# Patient Record
(demographics unavailable — no encounter records)

---

## 2024-10-30 NOTE — REASON FOR VISIT
[Follow - Up] : a follow-up visit [Thyroid nodule/ MNG] : thyroid nodule/ MNG [Transgender Care] : transgender care [Home] : at home, [unfilled] , at the time of the visit. [Medical Office: ([facility])___] : at the medical office located in  [Patient] : the patient

## 2024-10-30 NOTE — REVIEW OF SYSTEMS
[Fatigue] : fatigue [Recent Weight Gain (___ Lbs)] : recent weight gain: [unfilled] lbs [All other systems negative] : All other systems negative [Recent Weight Loss (___ Lbs)] : no recent weight loss [Visual Field Defect] : no visual field defect [Dysphagia] : no dysphagia [Dysphonia] : no dysphonia [Chest Pain] : no chest pain [Palpitations] : no palpitations [Shortness Of Breath] : no shortness of breath [Nausea] : no nausea [Vomiting] : no vomiting [Polyuria] : no polyuria [Joint Pain] : no joint pain [Headaches] : no headaches [Tremors] : no tremors [Depression] : no depression [Polydipsia] : no polydipsia [Cold Intolerance] : no cold intolerance [Heat Intolerance] : no heat intolerance [FreeTextEntry4] : +neck pressure when nodule develops fluid

## 2024-10-30 NOTE — ASSESSMENT
[FreeTextEntry1] : 27 y/o trans male on masculinizing gender affirming hormone therapy since 2016. Discussed hormone affirming treatment for masculinization with testosterone. Reviewed various formulations of testosterone. Pt. was interested in switching to Testopel. s/p last implantation with 10 pellets on the right 3/2024. Check levels today. Risks and benefits of testosterone described including polycythemia and liver effects. Reviewed goal testosterone level of 300-400 as trough and 700-800 as peak level. Mid-range ideally should be 500-600 for cis-gender mid male range. GYN follow-up recommended especially if interested in LOUANN. No plans for reproductive assistance. Will needs age-appropriate cancer screening in the future such as mammogram, colonoscopy, PAP until he has bottom surgery.  Continue to monitor thyroid nodules with Thyroid US. Thyroid US done 8/2023 at bedside. Thyroid US in office showed mostly cystic nodule with a small amount of isoechoic solid component measuring 2.1x1.9x1.1cm. Thyroid cyst aspiration done at bedside with patient's consent due to patient's report of discomfort from the nodule. After the thyroid cystic drainage, the nodule appeared stable in size but more solid isoechoic. No calcifications. Samples sent for cytology with evidence of hemorrhagic cyst. Given increasing size and recurrence of fluid would recommend hemithyroidectomy if continues to be bothersome.   HLD- check lipids

## 2024-10-30 NOTE — PHYSICAL EXAM
[Alert] : alert [Well Nourished] : well nourished [Healthy Appearance] : healthy appearance [No Acute Distress] : no acute distress [Well Developed] : well developed [No Proptosis] : no proptosis [No Respiratory Distress] : no respiratory distress [No Accessory Muscle Use] : no accessory muscle use [Normal Rate and Effort] : normal respiratory rate and effort [No Stigmata of Cushings Syndrome] : no stigmata of Cushings Syndrome [No Involuntary Movements] : no involuntary movements were seen [Oriented x3] : oriented to person, place, and time [Normal Affect] : the affect was normal [Normal Mood] : the mood was normal [Normal Hearing] : hearing was normal [Acanthosis Nigricans] : no acanthosis nigricans [de-identified] : +healed left pellet site

## 2024-10-30 NOTE — HISTORY OF PRESENT ILLNESS
[FreeTextEntry1] : Preferred Name: Israel  Preferred Pronouns: He/Him Name/Gender Marker Change: Completed.   On masculinizing gender affirming hormone therapy since 2016. Had been on testosterone cypionate 200mg/ml was injecting 0.7ml every 2 weeks, provided by Dr. Moshe Trevizo at [facility]. Changed to 0.35ml every week IM 4/2022. Seen initially by me 7/2022, thought dose was too low due to cramping around 4 days after injection. Recommended taking 0.5ml weekly, but self-lowered to 0.45ml due to cramping at the beginning of the week. Had some spotting and bleeding with some nonadherence due to lack of medication refills. Recommended Testopel s/p implant 12/2022 with 10 pellets on the left and then had a second implant with 10 pellets on the right in 5/2023. Last Testopel 8/2024. Tolerated well. Has noticed more stable energy. Happy with pellets vs. injections. Noticed some cramping recently. Also reports some fatigue after eating. Since starting testosterone has developed increased facial hair, deeper voice, body fat redistribution. Looking to maintain changes. Overall, patient happy that since testopel he has had more facial hair growth and is shaving daily Plans on donating blood this week.  Interested in LOUANN. Not interested in egg freezing.  +hx of menorrhagia without oligomenorrhea tried on OCP without improvement at age 18. 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 Mackay which helped with dysphoria, but he says it can also cause dysmorphia. Prior to top surgery was binding with underworks full torso with worsening of asthma and breathing.  Dresses masculine. Doesn't pack. No headaches, changes in vision, chest pain, SOB, palpitations, LE swelling, calf pain.  +fatigue improved s/p pellets. Has noticed some worsening depression and anxiety now seeing Cassandra. No personal hx of thromboembolic disease. Father with hx of DVT.  Maternal Grandmother and Aunt with Breast cancer. No personal hx of liver disorders.  Good support with friends and family.  Has been in therapy and psychiatry on and off since 2010. Has been diagnosed with PTSD, depression, anxiety, and ADHD in the past.   Hx of Thyroid nodules. Last Thyroid US 8/2023: Heterogenous gland. Isthmus nodule 1.1 x 0.8 x 0.5; Right 2.2 x 2.2 x 1.4; additional right subcentimeter nodules; 0.5 x 0.7 x 0.3cm left nodule.   FNA Right Thyroid Nodule 7/2015: 2.1 cm x 1.0 x 1.09 benign consistent with nodular goiter with cystic changes.  Had noticed some neck pressure when nodule develops more fluid. Aspiration performed 8/2023 with path consistent with hemorrhagic cyst and improvement in symptoms. No recent dysphonia or dysphagia.  Following with Dr. Guan. Will be considering hemithyroidectomy.  Chemically euthyroid as of 6/2024.  Patient reports a family history of thyroid cancer. Denies dysphagia to solid/liquids and reports improvement in discomfort with swallowing. Denies dysphonia or dyspnea/choking. Reports diffixulty losing weight.

## 2024-10-30 NOTE — DATA REVIEWED
[FreeTextEntry1] : Labs 6/2024: Testosterone 399 Hb 16.6 A1c 5.1%  Chol 225 HDL 49  CMP normal  TSH 0.53 Free T4 1.3  Labs 11/2023: Hb 15.7 A1c 4.9% TG 85 Chol 199 HDL 50  CMP normal Testosterone 152  FNA 8/2023: Consistent with hemorrhagic cyst.   Labs 8/2023: Testosterone 363 Hb 15.9 CMP normal  Focused Thyroid US 8/2/2023: mostly cystic nodule with a small amount of isoechoic solid component measuring 2.1x1.9x1.1cm. Thyroid cyst aspiration done at bedside with patient's consent due to patient's report of discomfort from the nodule. After the thyroid cystic drainage, the nodule appeared stable in size but more solid isoechoic. No calcifications.   Labs 3/2023: Testosterone 468 TSH 0.72 Free T4 1.3  Chol 179 HDL 46  CMP normal Labs 2/8/2023: Testosterone 467 Hb 17.1  Labs 9/2022: Hb 15.5 A1c 5.1% TSH 0.57 Free T4 1.5 Testosterone 448 TG 73 Chol 162 HDL 43  CMP normal  Labs 3/2022: Ca 10.3 PTH 30 Free T4 1.4 TSH 0.43 TPO Ab +   Thyroid US 3/2022: Heterogenous gland. Isthmus nodule 1.1 x 0.8 x 0.5; Right 2.2 x 2.2 x 1.4; additional right subcentimeter nodules; 0.5 x 0.7 x 0.3cm left nodule.   FNA Right Thyroid Nodule 7/2015: 2.1 cm x 1.0 x 1.09 benign consistent with nodular goiter with cystic changes.

## 2024-12-30 NOTE — HISTORY OF PRESENT ILLNESS
[de-identified] : Mr. Allen is a 28-year-old transgender man, with history significant for Hashimoto's thyroiditis and cervical disc disease, who presents for initial evaluation of a right thyroid nodule.  He states that he was diagnosed with a thyroid nodule at age 12 and has since had almost yearly follow-up ultrasounds and biopsies and/or cyst aspirations.  His most recent ultrasound-guided FNA biopsy, performed 11/05/2024 ([facility]), reported a right mid 1.4 cm heterogeneous thyroid nodule which was cytologically felt to be consistent with a benign nodule (Opelika II).  When compared to his prior imaging, the nodule measured smaller.  FNA biopsies of the nodule performed on 08/14/2015 ([facility]) and 03/20/2020 ([facility]) were also felt to be consistent with benign nodules (Opelika II).  He states that his father and paternal uncle had thyroid cancer and that his mother has hypothyroidism.  He reports intermittent sharp right neck pain and occasional "pressure" with swallowing.  Labs performed 06/18/2024 revealed TSH = 0.53.  Labs performed 03/29/2022 revealed an elevated TPO Ab = 35.6 and serum TG = < 20.

## 2024-12-30 NOTE — ASSESSMENT
[FreeTextEntry1] : Assessment:  28-year-old transgender man, with history significant for Hashimoto's thyroiditis and cervical disc disease, presents with a likely symptomatic presumably benign right thyroid nodule.   Plan: - Management options of his likely symptomatic presumably benign right thyroid nodule were discussed including continued observation, versus undergoing an elective right thyroid lobectomy, versus RFA.   - The risks of thyroid surgery, including but not limited to, bleeding/hematoma formation, infection, damage to surrounding structures (namely the recurrent laryngeal nerve with resultant transient or permanent hoarseness of voice, the external branch of the superior laryngeal nerve with resultant change in pitch/projection of voice, and the parathyroid glands with resultant transient or permanent hypocalcemia), wound healing issues including internal/external scarring, regional numbness, and seroma formation, the need for thyroid hormone replacement, and the possibility of persistent and/or worsening of symptoms were then discussed and all questions were answered. - The patient expressed understanding of the above and stated that he wanted to look into RFA.  He was therefore provided with the name and contact information for Dr. Cardozo who performs RFA at our institution.  He will continue to follow-up with his endocrinologist and was encouraged to follow-up with me if he would like to schedule surgery.

## 2024-12-30 NOTE — PHYSICAL EXAM
[Midline] : located in midline position [Normal] : orientation to person, place, and time: normal [de-identified] : The neck appears flat.  There are no palpable masses.  A limited in-office ultrasound revealed a mostly homogeneous thyroid a dominant right mid anterior predominantly solid nodule with shadowing calcification and a smaller circumscribed right mid medial heterogeneous nodule.  [de-identified] : Extremities: CAMILO x 4.   Skin: No obvious skin lesions.   Voice: clear

## 2025-01-31 NOTE — PROCEDURE
[FreeTextEntry1] : The patient was prepped and draped in a sterile manner, 9 mL of 1% lidocaine (with epinephrine) was injected using a needle along the right lateral and upper buttocks. 10 Testopel pellets were implanted without difficulty  Exp: 01/2027 Lot:  S/N: [id number] GTIN: [id number]  15 minutes after the procedure the patient was examined. Blood pressure and pulse were obtained and were at his baseline. There was minimal blood stain on the dressing. Discharge instructions were reviewed. He was discharged home in excellent condition. An appointment was made for followup in 6 weeks. He will call with any questions or concerns.

## 2025-03-19 NOTE — HISTORY OF PRESENT ILLNESS
[FreeTextEntry1] : Preferred Name: Israel  Preferred Pronouns: He/Him Name/Gender Marker Change: Completed.   On masculinizing gender affirming hormone therapy since 2016. Had been on testosterone cypionate 200mg/ml was injecting 0.7ml every 2 weeks, provided by Dr. Moshe Trevizo at [facility]. Changed to 0.35ml every week IM 4/2022. Seen initially by me 7/2022, thought dose was too low due to cramping around 4 days after injection. Recommended taking 0.5ml weekly, but self-lowered to 0.45ml due to cramping at the beginning of the week. Had some spotting and bleeding with some nonadherence due to lack of medication refills. Recommended Testopel s/p implant 12/2022 with 10 pellets on the left and then had a second implant with 10 pellets on the right in 5/2023. Last Testopel 1/2025. Tolerated well. Has noticed more stable energy. Happy with pellets vs. injections. No recent cramping or spotting. Since starting testosterone has developed increased facial hair, deeper voice, body fat redistribution. Looking to maintain changes. Overall, patient happy that since testopel he has had more facial hair growth and is shaving daily Donates blood every 4 months.  Interested in LOUANN. Not interested in egg freezing.  +hx of menorrhagia without oligomenorrhea tried on OCP without improvement at age 18. 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 Mackay which helped with dysphoria, but he says it can also cause dysmorphia. Prior to top surgery was binding with underworks full torso with worsening of asthma and breathing.  Dresses masculine. Doesn't pack. No headaches, changes in vision, chest pain, SOB, palpitations, LE swelling, calf pain.  +fatigue improved s/p pellets. Has noticed some worsening depression and anxiety now seeing Cassandra. No personal hx of thromboembolic disease. Father with hx of DVT.  Maternal Grandmother and Aunt with Breast cancer. No personal hx of liver disorders.  Good support with friends and family.  Has been in therapy and psychiatry on and off since 2010. Has been diagnosed with PTSD, depression, anxiety, and ADHD in the past.   Hx of Thyroid nodules. Last Thyroid US 8/2023: Heterogenous gland. Isthmus nodule 1.1 x 0.8 x 0.5; Right 2.2 x 2.2 x 1.4; additional right subcentimeter nodules; 0.5 x 0.7 x 0.3cm left nodule.   FNA Right Thyroid Nodule 7/2015: 2.1 cm x 1.0 x 1.09 benign consistent with nodular goiter with cystic changes.  Had noticed some neck pressure when nodule develops more fluid. Aspiration performed 8/2023 with path consistent with hemorrhagic cyst and improvement in symptoms. No recent dysphonia or dysphagia.  Following with Dr. Guan. Will be considering hemithyroidectomy vs. RFA.  Chemically euthyroid as of 6/2024.  Patient reports a family history of thyroid cancer. Denies dysphagia to solid/liquids and reports improvement in discomfort with swallowing. Denies dysphonia or dyspnea/choking. Reports difficulty losing weight. Has lost some weight with dietary modifications. Concerned about visceral fat. Interested in incretin therapy.

## 2025-03-19 NOTE — REASON FOR VISIT
[Home] : at home, [unfilled] , at the time of the visit. [Medical Office: ([facility])___] : at the medical office located in  [Telehealth (audio & video)] : This visit was provided via telehealth using real-time 2-way audio visual technology. [Verbal consent obtained from patient] : the patient, [unfilled] [Follow - Up] : a follow-up visit

## 2025-03-19 NOTE — ASSESSMENT
[FreeTextEntry1] : 27 y/o trans male on masculinizing gender affirming hormone therapy since 2016. Discussed hormone affirming treatment for masculinization with testosterone. Reviewed various formulations of testosterone. Pt. was interested in switching to Testopel. s/p last implantation 1/2025 with recent levels 3/2025 at goal. Risks and benefits of testosterone described including polycythemia and liver effects. Reviewed goal testosterone level of 300-400 as trough and 700-800 as peak level. Mid-range ideally should be 500-600 for cis-gender mid male range. GYN follow-up recommended especially if interested in LOUANN. No plans for reproductive assistance. Will needs age-appropriate cancer screening in the future such as mammogram, colonoscopy, PAP until he has bottom surgery.  Continue to monitor thyroid nodules with Thyroid US. Thyroid US done 8/2023 at bedside. Thyroid US in office showed mostly cystic nodule with a small amount of isoechoic solid component measuring 2.1x1.9x1.1cm. Thyroid cyst aspiration done at bedside with patient's consent due to patient's report of discomfort from the nodule. After the thyroid cystic drainage, the nodule appeared stable in size but more solid isoechoic. No calcifications. Samples sent for cytology with evidence of hemorrhagic cyst. Given increasing size and recurrence of fluid would recommend hemithyroidectomy if continues to be bothersome. Pt. interested in possible RFA but discussed with him that it may not be as beneficial on a cyst as compared to a solid nodule.   HLD- dietary and lifestyle modifications discussed.  Weight gain- dietary and lifestyle modifications to continue. Consider incretin therapy if no benefit.    Prep time with review of labs and interval progress notes and consultations  Discussion with patient regarding hormone regimen, thyroid, and weight with management plan, risks and benefits, treatment options and goals of care answering all patients questions and addressing all concerns  Post-visit completion charting and review Total Time 30 min  Specifically causes, evaluation, treatment options, risks, complications, and benefits of available therapies were discussed. Questions were answered.  The submitted E/M billing level for this visit reflects the total time spent on the day of the visit including face-to-face time spent with the patient, non-face-to-face review of medical records and relevant information, documentation, and asynchronous communication with the patient after a visit via phone, email, or patients EHR portal after the visit.  The medical records reviewed are either scanned into the chart or reviewed with the patient using a patients electronic medical records portal for patients with records not available to [facility] via electronic transmission platforms from other institutions and labs.  Time spend counseling and performing coordination of care was also included in determining the appropriate EM billing level.  I have reviewed and verified information regarding the chief complaint and history recorded by the ancillary staff and/or the patient. I have independently reviewed and interpreted tests performed by other physicians and facilities as necessary.   I have discussed with the patient differential diagnosis, reason for auxiliary tests if ordered, risks, benefits, alternatives, and complications of each form of therapy were discussed.

## 2025-03-19 NOTE — PHYSICAL EXAM
[Alert] : alert [Well Nourished] : well nourished [Healthy Appearance] : healthy appearance [No Acute Distress] : no acute distress [Well Developed] : well developed [No Proptosis] : no proptosis [Normal Hearing] : hearing was normal [No Respiratory Distress] : no respiratory distress [No Accessory Muscle Use] : no accessory muscle use [Normal Rate and Effort] : normal respiratory rate and effort [No Stigmata of Cushings Syndrome] : no stigmata of Cushings Syndrome [No Involuntary Movements] : no involuntary movements were seen [Oriented x3] : oriented to person, place, and time [Normal Affect] : the affect was normal [Normal Mood] : the mood was normal [Acanthosis Nigricans] : no acanthosis nigricans [de-identified] : +healed left pellet site

## 2025-03-19 NOTE — HISTORY OF PRESENT ILLNESS
[FreeTextEntry1] : Preferred Name: Israel  Preferred Pronouns: He/Him Name/Gender Marker Change: Completed.   On masculinizing gender affirming hormone therapy since 2016. Had been on testosterone cypionate 200mg/ml was injecting 0.7ml every 2 weeks, provided by Dr. Moshe Trevizo at [facility]. Changed to 0.35ml every week IM 4/2022. Seen initially by me 7/2022, thought dose was too low due to cramping around 4 days after injection. Recommended taking 0.5ml weekly, but self-lowered to 0.45ml due to cramping at the beginning of the week. Had some spotting and bleeding with some nonadherence due to lack of medication refills. Recommended Testopel s/p implant 12/2022 with 10 pellets on the left and then had a second implant with 10 pellets on the right in 5/2023. Last Testopel 1/2025. Tolerated well. Has noticed more stable energy. Happy with pellets vs. injections. No recent cramping or spotting. Since starting testosterone has developed increased facial hair, deeper voice, body fat redistribution. Looking to maintain changes. Overall, patient happy that since testopel he has had more facial hair growth and is shaving daily Donates blood every 4 months.  Interested in LOUANN. Not interested in egg freezing.  +hx of menorrhagia without oligomenorrhea tried on OCP without improvement at age 18. 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 Mackay which helped with dysphoria, but he says it can also cause dysmorphia. Prior to top surgery was binding with underworks full torso with worsening of asthma and breathing.  Dresses masculine. Doesn't pack. No headaches, changes in vision, chest pain, SOB, palpitations, LE swelling, calf pain.  +fatigue improved s/p pellets. Has noticed some worsening depression and anxiety now seeing Cassandra. No personal hx of thromboembolic disease. Father with hx of DVT.  Maternal Grandmother and Aunt with Breast cancer. No personal hx of liver disorders.  Good support with friends and family.  Has been in therapy and psychiatry on and off since 2010. Has been diagnosed with PTSD, depression, anxiety, and ADHD in the past.   Hx of Thyroid nodules. Last Thyroid US 8/2023: Heterogenous gland. Isthmus nodule 1.1 x 0.8 x 0.5; Right 2.2 x 2.2 x 1.4; additional right subcentimeter nodules; 0.5 x 0.7 x 0.3cm left nodule.   FNA Right Thyroid Nodule 7/2015: 2.1 cm x 1.0 x 1.09 benign consistent with nodular goiter with cystic changes.  Had noticed some neck pressure when nodule develops more fluid. Aspiration performed 8/2023 with path consistent with hemorrhagic cyst and improvement in symptoms. No recent dysphonia or dysphagia.  Following with Dr. Guan. Will be considering hemithyroidectomy vs. RFA.  Chemically euthyroid as of 6/2024.  Patient reports a family history of thyroid cancer. Denies dysphagia to solid/liquids and reports improvement in discomfort with swallowing. Denies dysphonia or dyspnea/choking. Reports difficulty losing weight. Has lost some weight with dietary modifications. Concerned about visceral fat. Interested in incretin therapy.

## 2025-03-19 NOTE — REASON FOR VISIT
[Home] : at home, [unfilled] , at the time of the visit. [Medical Office: ([facility])___] : at the medical office located in  [Telehealth (audio & video)] : This visit was provided via telehealth using real-time 2-way audio visual technology. [Verbal consent obtained from patient] : the patient, [unfilled] [Follow - Up] : a follow-up visit

## 2025-03-19 NOTE — REVIEW OF SYSTEMS
[Fatigue] : fatigue [All other systems negative] : All other systems negative [Recent Weight Gain (___ Lbs)] : no recent weight gain [Recent Weight Loss (___ Lbs)] : recent weight loss: [unfilled] lbs [Visual Field Defect] : no visual field defect [Dysphagia] : no dysphagia [Dysphonia] : no dysphonia [Chest Pain] : no chest pain [Palpitations] : no palpitations [Shortness Of Breath] : no shortness of breath [Nausea] : no nausea [Vomiting] : no vomiting [Polyuria] : no polyuria [Joint Pain] : no joint pain [Headaches] : no headaches [Tremors] : no tremors [Depression] : no depression [Polydipsia] : no polydipsia [Cold Intolerance] : no cold intolerance [Heat Intolerance] : no heat intolerance [FreeTextEntry4] : +neck pressure when nodule develops fluid

## 2025-03-19 NOTE — PHYSICAL EXAM
[Alert] : alert [Well Nourished] : well nourished [Healthy Appearance] : healthy appearance [No Acute Distress] : no acute distress [Well Developed] : well developed [No Proptosis] : no proptosis [Normal Hearing] : hearing was normal [No Respiratory Distress] : no respiratory distress [No Accessory Muscle Use] : no accessory muscle use [Normal Rate and Effort] : normal respiratory rate and effort [No Stigmata of Cushings Syndrome] : no stigmata of Cushings Syndrome [No Involuntary Movements] : no involuntary movements were seen [Oriented x3] : oriented to person, place, and time [Normal Affect] : the affect was normal [Normal Mood] : the mood was normal [Acanthosis Nigricans] : no acanthosis nigricans [de-identified] : +healed left pellet site

## 2025-03-19 NOTE — ASSESSMENT
[FreeTextEntry1] : 27 y/o trans male on masculinizing gender affirming hormone therapy since 2016. Discussed hormone affirming treatment for masculinization with testosterone. Reviewed various formulations of testosterone. Pt. was interested in switching to Testopel. s/p last implantation 1/2025 with recent levels 3/2025 at goal. Risks and benefits of testosterone described including polycythemia and liver effects. Reviewed goal testosterone level of 300-400 as trough and 700-800 as peak level. Mid-range ideally should be 500-600 for cis-gender mid male range. GYN follow-up recommended especially if interested in [facility]. No plans for reproductive assistance. Will needs age-appropriate cancer screening in the future such as mammogram, colonoscopy, PAP until he has bottom surgery.  Continue to monitor thyroid nodules with Thyroid US. Thyroid US done 8/2023 at bedside. Thyroid US in office showed mostly cystic nodule with a small amount of isoechoic solid component measuring 2.1x1.9x1.1cm. Thyroid cyst aspiration done at bedside with patient's consent due to patient's report of discomfort from the nodule. After the thyroid cystic drainage, the nodule appeared stable in size but more solid isoechoic. No calcifications. Samples sent for cytology with evidence of hemorrhagic cyst. Given increasing size and recurrence of fluid would recommend hemithyroidectomy if continues to be bothersome. Pt. interested in possible RFA but discussed with him that it may not be as beneficial on a cyst as compared to a solid nodule.   HLD- dietary and lifestyle modifications discussed.  Weight gain- dietary and lifestyle modifications to continue. Consider incretin therapy if no benefit.    Prep time with review of labs and interval progress notes and consultations  Discussion with patient regarding hormone regimen, thyroid, and weight with management plan, risks and benefits, treatment options and goals of care answering all patients questions and addressing all concerns  Post-visit completion charting and review Total Time 30 min  Specifically causes, evaluation, treatment options, risks, complications, and benefits of available therapies were discussed. Questions were answered.  The submitted E/M billing level for this visit reflects the total time spent on the day of the visit including face-to-face time spent with the patient, non-face-to-face review of medical records and relevant information, documentation, and asynchronous communication with the patient after a visit via phone, email, or patients EHR portal after the visit.  The medical records reviewed are either scanned into the chart or reviewed with the patient using a patients electronic medical records portal for patients with records not available to [facility] via electronic transmission platforms from other institutions and labs.  Time spend counseling and performing coordination of care was also included in determining the appropriate EM billing level.  I have reviewed and verified information regarding the chief complaint and history recorded by the ancillary staff and/or the patient. I have independently reviewed and interpreted tests performed by other physicians and facilities as necessary.   I have discussed with the patient differential diagnosis, reason for auxiliary tests if ordered, risks, benefits, alternatives, and complications of each form of therapy were discussed.

## 2025-03-19 NOTE — DATA REVIEWED
[FreeTextEntry1] : Labs 3/2025: Testosterone 682 CMP normal except ALKP of 121 Hb 14.4  Labs 6/2024: Testosterone 399 Hb 16.6 A1c 5.1%  Chol 225 HDL 49  CMP normal  TSH 0.53 Free T4 1.3  Labs 11/2023: Hb 15.7 A1c 4.9% TG 85 Chol 199 HDL 50  CMP normal Testosterone 152  FNA 8/2023: Consistent with hemorrhagic cyst.   Labs 8/2023: Testosterone 363 Hb 15.9 CMP normal  Focused Thyroid US 8/2/2023: mostly cystic nodule with a small amount of isoechoic solid component measuring 2.1x1.9x1.1cm. Thyroid cyst aspiration done at bedside with patient's consent due to patient's report of discomfort from the nodule. After the thyroid cystic drainage, the nodule appeared stable in size but more solid isoechoic. No calcifications.   Labs 3/2023: Testosterone 468 TSH 0.72 Free T4 1.3  Chol 179 HDL 46  CMP normal Labs 2/8/2023: Testosterone 467 Hb 17.1  Labs 9/2022: Hb 15.5 A1c 5.1% TSH 0.57 Free T4 1.5 Testosterone 448 TG 73 Chol 162 HDL 43  CMP normal  Labs 3/2022: Ca 10.3 PTH 30 Free T4 1.4 TSH 0.43 TPO Ab +   Thyroid US 3/2022: Heterogenous gland. Isthmus nodule 1.1 x 0.8 x 0.5; Right 2.2 x 2.2 x 1.4; additional right subcentimeter nodules; 0.5 x 0.7 x 0.3cm left nodule.   FNA Right Thyroid Nodule 7/2015: 2.1 cm x 1.0 x 1.09 benign consistent with nodular goiter with cystic changes.